# Patient Record
Sex: FEMALE | Race: WHITE | Employment: FULL TIME | URBAN - METROPOLITAN AREA
[De-identification: names, ages, dates, MRNs, and addresses within clinical notes are randomized per-mention and may not be internally consistent; named-entity substitution may affect disease eponyms.]

---

## 2018-06-07 ENCOUNTER — HOSPITAL ENCOUNTER (OUTPATIENT)
Dept: MRI IMAGING | Age: 38
Discharge: HOME | End: 2018-06-07
Attending: FAMILY MEDICINE | Admitting: FAMILY MEDICINE

## 2018-06-07 DIAGNOSIS — S40.011A CONTUSION OF RIGHT SHOULDER, INITIAL ENCOUNTER: ICD-10-CM

## 2018-06-07 DIAGNOSIS — S40.011A CONTUSION OF RIGHT SHOULDER: ICD-10-CM

## 2018-06-26 ENCOUNTER — OFFICE VISIT (OUTPATIENT)
Dept: ORTHOPEDIC SURGERY | Age: 38
End: 2018-06-26

## 2018-06-26 VITALS
WEIGHT: 163 LBS | HEART RATE: 75 BPM | HEIGHT: 69 IN | DIASTOLIC BLOOD PRESSURE: 79 MMHG | BODY MASS INDEX: 24.14 KG/M2 | SYSTOLIC BLOOD PRESSURE: 113 MMHG

## 2018-06-26 DIAGNOSIS — M75.41 SHOULDER IMPINGEMENT, RIGHT: ICD-10-CM

## 2018-06-26 DIAGNOSIS — M25.511 ACUTE PAIN OF RIGHT SHOULDER: ICD-10-CM

## 2018-06-26 DIAGNOSIS — S46.011A STRAIN OF RIGHT ROTATOR CUFF CAPSULE, INITIAL ENCOUNTER: Primary | ICD-10-CM

## 2018-06-26 PROBLEM — M25.811 SHOULDER IMPINGEMENT, RIGHT: Status: ACTIVE | Noted: 2018-06-26

## 2018-06-26 PROCEDURE — 99243 OFF/OP CNSLTJ NEW/EST LOW 30: CPT | Performed by: FAMILY MEDICINE

## 2018-06-26 RX ORDER — METHYLPREDNISOLONE 4 MG/1
TABLET ORAL
Qty: 21 KIT | Refills: 0 | Status: SHIPPED | OUTPATIENT
Start: 2018-06-26 | End: 2018-07-12 | Stop reason: ALTCHOICE

## 2018-06-26 RX ORDER — DICLOFENAC SODIUM 75 MG/1
75 TABLET, DELAYED RELEASE ORAL 2 TIMES DAILY
Qty: 60 TABLET | Refills: 3 | Status: SHIPPED | OUTPATIENT
Start: 2018-06-26 | End: 2019-01-22

## 2018-07-05 ENCOUNTER — TELEPHONE (OUTPATIENT)
Dept: PHYSICAL THERAPY | Age: 38
End: 2018-07-05

## 2018-07-10 ENCOUNTER — HOSPITAL ENCOUNTER (OUTPATIENT)
Dept: PHYSICAL THERAPY | Age: 38
Discharge: HOME | End: 2018-07-31
Attending: FAMILY MEDICINE | Admitting: FAMILY MEDICINE

## 2018-07-10 NOTE — FLOWSHEET NOTE
coordination, kinesthetic sense, posture, motor skill, proprioception and motor activation to allow for proper function of scapular, scapulothoracic and UE control with self care, carrying, lifting, driving/computer work. Home Exercise Program:    [x] (79047) Reviewed/Progressed HEP activities related to strengthening, flexibility, endurance, ROM of scapular, scapulothoracic and UE control with self care, reaching, carrying, lifting, house/yardwork, driving/computer work  [] (60957) Reviewed/Progressed HEP activities related to improving balance, coordination, kinesthetic sense, posture, motor skill, proprioception of scapular, scapulothoracic and UE control with self care, reaching, carrying, lifting, house/yardwork, driving/computer work      Manual Treatments:  PROM / STM / Oscillations-Mobs:  G-I, II, III, IV (PA's, Inf., Post.)  [] (14996) Provided manual therapy to mobilize soft tissue/joints of cervical/CT, scapular GHJ and UE for the purpose of modulating pain, promoting relaxation,  increasing ROM, reducing/eliminating soft tissue swelling/inflammation/restriction, improving soft tissue extensibility and allowing for proper ROM for normal function with self care, reaching, carrying, lifting, house/yardwork, driving/computer work    Modalities:  15' ice with ESU (premod). Pt denied precautions and contraindications to stim (even regarding adhesives). She consented to its use, and reports that it did help a bit. Charges:  Timed Code Treatment Minutes: 23'   Total Treatment Minutes: 61'     [x] EVAL (LOW) 21192   [] EVAL (MOD) 74544   [] EVAL (HIGH) 87714   [] RE-EVAL   [x] ENOCH(45202) x  1   [] IONTO  [] NMR (92832) x      [] VASO  [] Manual (88695) x       [] Other:  [x] TA x  1    [] Mech Traction (40531)  [] ES(attended) (39015)      [x] ES (un) (48273):     GOALS:  Patient stated goal: play with daughter more and work without pain    Therapist goals for Patient:   Short Term Goals:  To be achieved in: 2 weeks  1. Pt will be independent in HEP and progression per patient tolerance, in order to prevent re-injury. 2. Patient will report pain at worst less than or equal to 8/10  to facilitate improvement in movement, function, and ADLs as indicated by dunctional seficits. Long Term Goals: To be achieved in: 8 weeks  1. Pt will demo a UEFI of greater than or equal to 85% to assist with reaching prior level of function. 2. Patient will demonstrate increased AROM shoulder flexion greater than or equal to 170, ABD greater than or equal to 170, IR greater than or equal 50, ER greater than or equal to 85 to allow for proper joint functioning as indicated by patient's functional deficits. 3. Patient will demonstrate an increase in strength to 4 to allow for proper functional mobility as indicated by patient's functional deficits. 4. Patient will return to full work duty without increased symptoms or restriction. 5. Pt will return to playing and performing care for daughter without c/o pain. 6.  Pt will report pain at worst less than or equal to 2/10.    7.  Pt will perform all self care without c/o pain. Progression Towards Functional goals:  [] Patient is progressing as expected towards functional goals listed. [] Progression is slowed due to complexities listed. [] Progression has been slowed due to co-morbidities. [x] Plan just implemented, too soon to assess goals progression  [] Other:      ASSESSMENT:  See eval    Treatment/Activity Tolerance:  [] Patient tolerated treatment well [] Patient limited by fatigue  [x] Patient limited by pain  [] Patient limited by other medical complications  [] Other: Pt is a 45 y/o female presenting with diagnosis of right shoulder RCT strain/contusion from the MD.  Clinically, the pt presents with decreased ROM, decreased strength, decreased function, and increased pain consistent with the MD diagnosis.   The pt would benefit from skilled PT to return to

## 2018-07-10 NOTE — PLAN OF CARE
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  11/10 at worst.  It makes her cry if she has to work [employer]. Type: [x]Constant   []Intermittent  []Radiating []Localized []other:     Numbness/Tingling: In shoulder area since the fall    Functional Limitations/Impairments: [x]Lifting/reaching [x]Grooming [x]Carrying    [x]ADL's [x]Driving [x]Sports/Recreations   []Other:    Occupation/School:  at [employer]. 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 soccer  (insert highest prior level of function)    OBJECTIVE:     CERV ROM WFL    Cervical Flexion     Cervical Extension     Cervical SB     Cervical rotation Right is painful and slightly limited        ROM PROM AROM  Comment    L R L R    Flexion   177 109    Abduction   180 80    ER   116 40 at 60 ABD    IR   58 37 at 60 ABD    Other        Other             Strength L R Comment   Flexion 4+ NT secondary to pain    Abduction 4+     ER 4     IR 4+     Supraspinatus      Upper Trap      Lower Trap      Mid Trap      Rhomboids      Biceps      Triceps      Horizontal Abduction      Horizontal Adduction      Lats        Special Tests Left Right   Apley Scratch IR:  ER:   Cross body: IR:  ER:  Cross Body:   Neer's     Full Can     Empty Can     Mariella Pompae     Nerve Tension Testing     Speed's     Gibson's      Spurling's     Repeated Scaption                Reflexes/Sensation (myotomes/dermatomes): sensation intact to light touch    Joint mobility:  Due to pain   []Normal    [x]Hypo   []Hyper    Palpation: TTP mid trap, rhomboids, upper trap, biceps, RTC, biceps insertion    Functional Mobility/Transfers: see above    Posture: forward head. throacic kyphosis    Bandages/Dressings/Incisions: NA    Gait:  [facility]     Orthopedic Special Tests: See above                       [x] Patient history, allergies, meds reviewed. Medical chart reviewed. See intake form. moving and handling objects  Carrying, Moving and Handling Objects Current Status (): At least 40 percent but less than 60 percent impaired, limited or restricted  Carrying, Moving and Handling Objects Goal Status (): 0 percent impaired, limited or restricted    ASSESSMENT:   Functional Impairments   [x]Noted spinal or UE joint hypomobility   []Noted spinal or UE joint hypermobility   [x]Decreased UE functional ROM   [x]Decreased UE functional strength   []Abnormal reflexes/sensation/myotomal/dermatomal deficits   [x]Decreased RC/scapular/core strength and neuromuscular control   []other:      Functional Activity Limitations (from functional questionnaire and intake)   []Reduced ability to tolerate prolonged functional positions   []Reduced ability or difficulty with changes of positions or transfers between positions   []Reduced ability to maintain good posture and demonstrate good body mechanics with sitting, bending, and lifting   [x] Reduced ability or tolerance with driving and/or computer work   [x]Reduced ability to sleep   [x]Reduced ability to perform lifting, reaching, carrying tasks   [x]Reduced ability to tolerate impact through UE   [x]Reduced ability to reach behind back   [x]Reduced ability to  or hold objects   [x]Reduced ability to throw or toss an object   []other:    Participation Restrictions   [x]Reduced participation in self care activities   [x]Reduced participation in home management activities   [x]Reduced participation in work activities   [x]Reduced participation in social activities. [x]Reduced participation in sport/recreation activities. Classification:   []Signs/symptoms consistent with post-surgical status including decreased ROM, strength and function.   []Signs/symptoms consistent with joint sprain/strain   []Signs/symptoms consistent with shoulder impingement   []Signs/symptoms consistent with shoulder/elbow/wrist tendinopathy   []Signs/symptoms consistent with tolerance, in order to prevent re-injury. 2. Patient will report pain at worst less than or equal to 8/10  to facilitate improvement in movement, function, and ADLs as indicated by dunctional seficits. Long Term Goals: To be achieved in: 8 weeks  1. Pt will demo a UEFI of greater than or equal to 85% to assist with reaching prior level of function. 2. Patient will demonstrate increased AROM shoulder flexion greater than or equal to 170, ABD greater than or equal to 170, IR greater than or equal 50, ER greater than or equal to 85 to allow for proper joint functioning as indicated by patient's functional deficits. 3. Patient will demonstrate an increase in strength to 4 to allow for proper functional mobility as indicated by patient's functional deficits. 4. Patient will return to full work duty without increased symptoms or restriction. 5. Pt will return to playing and performing care for daughter without c/o pain. 6.  Pt will report pain at worst less than or equal to 2/10.    7.  Pt will perform all self care without c/o pain.       Physical Therapy Evaluation Complexity Justification  [x] A history of present problem with:  [] no personal factors and/or comorbidities that impact the plan of care;  [x]1-2 personal factors and/or comorbidities that impact the plan of care  []3 personal factors and/or comorbidities that impact the plan of care  [x] An examination of body systems using standardized tests and measures addressing any of the following: body structures and functions (impairments), activity limitations, and/or participation restrictions;:  [] a total of 1-2 or more elements   [] a total of 3 or more elements   [x] a total of 4 or more elements   [x] A clinical presentation with:  [x] stable and/or uncomplicated characteristics   [] evolving clinical presentation with changing characteristics  [] unstable and unpredictable characteristics;   [x] Clinical decision making of [x] low, [] moderate, [] high complexity using standardized patient assessment instrument and/or measurable assessment of functional outcome.     [x] EVAL (LOW) 31652 (typically 20 minutes face-to-face)  [] EVAL (MOD) 07510 (typically 30 minutes face-to-face)  [] EVAL (HIGH) 26143 (typically 45 minutes face-to-face)  [] [facility]        Electronically signed by:  Glo Couch, PT, DPT [id number]

## 2018-07-12 ENCOUNTER — OFFICE VISIT (OUTPATIENT)
Dept: ORTHOPEDIC SURGERY | Age: 38
End: 2018-07-12

## 2018-07-12 VITALS
BODY MASS INDEX: 24.14 KG/M2 | WEIGHT: 163 LBS | SYSTOLIC BLOOD PRESSURE: 110 MMHG | HEART RATE: 62 BPM | HEIGHT: 69 IN | DIASTOLIC BLOOD PRESSURE: 80 MMHG

## 2018-07-12 DIAGNOSIS — S40.011D CONTUSION OF RIGHT SHOULDER, SUBSEQUENT ENCOUNTER: Primary | ICD-10-CM

## 2018-07-12 PROBLEM — S40.011A CONTUSION OF RIGHT SHOULDER: Status: ACTIVE | Noted: 2018-07-12

## 2018-07-12 PROCEDURE — 20610 DRAIN/INJ JOINT/BURSA W/O US: CPT | Performed by: FAMILY MEDICINE

## 2018-07-12 PROCEDURE — 99213 OFFICE O/P EST LOW 20 MIN: CPT | Performed by: FAMILY MEDICINE

## 2018-07-12 NOTE — PROGRESS NOTES
sizable ganglion to the supra scapular space but no madie signs on MRI to suggest suprascapular nerve impingement or neurogenic edema. Once again Potential for right upper extremity EMG testing was discussed. We are still waiting diagnoses minute her claim that we have been allowed for a shoulder contusion. We have also gotten permission to perform a subacromial injection to her right shoulder which was performed today. We did inject her right shoulder today using 2 mL of Celestone, 4 marketing, free Xylocaine. I would like her to continue with physical therapy and her current modified work duty work restrictions. We will see her back in about 3-4 weeks for follow-up. She will contact us the interim with questions or concerns. Once again discussed potential for upper extremity EMG to evaluate for suprascapular nerve entrapment. This dictation was performed with a verbal recognition program (DRAGON) and it was checked for errors. It is possible that there are still dictated errors within this office note. If so, please bring any errors to my attention for an addendum. All efforts were made to ensure that this office note is accurate.

## 2018-07-19 ENCOUNTER — HOSPITAL ENCOUNTER (OUTPATIENT)
Dept: PHYSICAL THERAPY | Age: 38
Discharge: HOME | End: 2018-07-20
Admitting: FAMILY MEDICINE
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
## 2018-07-23 ENCOUNTER — HOSPITAL ENCOUNTER (OUTPATIENT)
Dept: PHYSICAL THERAPY | Age: 38
Discharge: HOME | End: 2018-07-24
Admitting: FAMILY MEDICINE

## 2018-07-23 NOTE — FLOWSHEET NOTE
Orthopaedics and [address] [place] [address] DR BRIAN CRUZ      Physical Therapy Daily Treatment Note  Date:  2018    Patient Name:  David Espinal    :  1980  MRN: [id number]  Medical/Treatment Diagnosis Information:  · Diagnosis: L77.478F (ICD-10-CM) - Strain of muscle(s) and tendon(s) of the rotator cuff of right shoulder. Onset- 13 May 2018  · Treatment Diagnosis: T15.933O (ICD-10-CM) - Strain of muscle(s) and tendon(s) of the rotator cuff of right shoulder, initial encounter  Insurance/Certification information:  PT Insurance Information: [id number]  Physician Information:  Referring Practitioner: Ragini Saavedra of care signed (Y/N):     Date of Patient follow up with Physician: 9 Aug 2018    G-Code (if applicable):      Date G-Code Applied:  7/10/2018    PT G-Codes  Functional Assessment Tool Used: UEFI  Score: 50%  Functional Limitation: Carrying, moving and handling objects  Carrying, Moving and Handling Objects Current Status (): At least 40 percent but less than 60 percent impaired, limited or restricted  Carrying, Moving and Handling Objects Goal Status (): 0 percent impaired, limited or restricted    Progress Note: []  Yes  [x]  No  Next due by: Visit #10 or 10 Aug 2018     Latex Allergy:  [x]NO      []YES  Preferred Language for Healthcare:   [x]English       []other:    Visit # Insurance Allowable   3 8 approved through 18     Pain level:  6/10     SUBJECTIVE: She had a rough morning with her daughter. She did get a call regarding her stim unit, but she was at the hospital and didn't get a chance to call her back. She does plan on calling her today to set up a time to meet with her and get the stim unit.       OBJECTIVE: 18   Observation:   Test measurements:      ROM PROM AROM  Comment    L R L R    Flexion  78 table slides, 98 hangs      Abduction        ER        IR        Other        Other             Strength L R Comment   Flexion      Abduction      ER      IR for activities related to improving balance, coordination, kinesthetic sense, posture, motor skill, proprioception and motor activation to allow for proper function of scapular, scapulothoracic and UE control with self care, carrying, lifting, driving/computer work. Home Exercise Program:    [x] (73441) Reviewed/Progressed HEP activities related to strengthening, flexibility, endurance, ROM of scapular, scapulothoracic and UE control with self care, reaching, carrying, lifting, house/yardwork, driving/computer work  [] (32787) Reviewed/Progressed HEP activities related to improving balance, coordination, kinesthetic sense, posture, motor skill, proprioception of scapular, scapulothoracic and UE control with self care, reaching, carrying, lifting, house/yardwork, driving/computer work      Manual Treatments:  PROM / STM / Oscillations-Mobs:  G-I, II, III, IV (PA's, Inf., Post.)  [] (62056) Provided manual therapy to mobilize soft tissue/joints of cervical/CT, scapular GHJ and UE for the purpose of modulating pain, promoting relaxation,  increasing ROM, reducing/eliminating soft tissue swelling/inflammation/restriction, improving soft tissue extensibility and allowing for proper ROM for normal function with self care, reaching, carrying, lifting, house/yardwork, driving/computer work    Modalities:  15' ice with ESU (premod). Pt denied precautions and contraindications to stim (even regarding adhesives). She consented to its use, and reports that it did help a bit.     Charges:   Timed Code Treatment Minutes: 40'   Total Treatment Minutes: 61'     [] EVAL (LOW) 455 1011   [] EVAL (MOD) 96259   [] EVAL (HIGH) 62782   [] RE-EVAL   [x] (58247) x  1   [] IONTO  [] NMR (42361) x      [] VASO  [] Manual (80325) x       [] Other:  [x] TA x  2    [] Mech Traction (70915)  [] ES(attended) (61082)      [x] ES (un) (59557):     GOALS:  Patient stated goal: play with daughter more and work without pain    Therapist goals for Patient:   Short Term Goals: To be achieved in: 2 weeks  1. Pt will be independent in HEP and progression per patient tolerance, in order to prevent re-injury. 2. Patient will report pain at worst less than or equal to 8/10  to facilitate improvement in movement, function, and ADLs as indicated by dunctional seficits. Long Term Goals: To be achieved in: 8 weeks  1. Pt will demo a UEFI of greater than or equal to 85% to assist with reaching prior level of function. 2. Patient will demonstrate increased AROM shoulder flexion greater than or equal to 170, ABD greater than or equal to 170, IR greater than or equal 50, ER greater than or equal to 85 to allow for proper joint functioning as indicated by patient's functional deficits. 3. Patient will demonstrate an increase in strength to 4 to allow for proper functional mobility as indicated by patient's functional deficits. 4. Patient will return to full work duty without increased symptoms or restriction. 5. Pt will return to playing and performing care for daughter without c/o pain. 6.  Pt will report pain at worst less than or equal to 2/10.    7.  Pt will perform all self care without c/o pain. Progression Towards Functional goals:  [] Patient is progressing as expected towards functional goals listed. [] Progression is slowed due to complexities listed. [] Progression has been slowed due to co-morbidities. [x] Plan just implemented, too soon to assess goals progression  [] Other:      ASSESSMENT:      Treatment/Activity Tolerance:  [] Patient tolerated treatment well [] Patient limited by fatigue  [x] Patient limited by pain  [] Patient limited by other medical complications  [] Other:  Pt continues to have significant pain that limits her. She is motivated to push herself through her exercises. She did have less pain today when entering the clinic; however, her motion is still limited due to pain.   She will continue to look into getting her home stim unit. Prognosis: [] Good [] Fair  [] Poor    Patient Requires Follow-up: [x] Yes  [] No    PLAN: Consider shrugs. Consider manual stretching.    [x] Continue per plan of care [] Alter current plan (see comments)  [] Plan of care initiated [] Hold pending MD visit [] Discharge    Electronically signed by: MATEO Trejo [id number]

## 2018-07-25 ENCOUNTER — HOSPITAL ENCOUNTER (OUTPATIENT)
Dept: PHYSICAL THERAPY | Age: 38
Discharge: HOME | End: 2018-07-26
Admitting: FAMILY MEDICINE

## 2018-07-25 NOTE — FLOWSHEET NOTE
Lats          RESTRICTIONS/PRECAUTIONS: some allergies to some adhesives    Exercises/Interventions:   Exercise/Equipment Resistance/Repetitions Other comments   Aerobic Conditioning     Aerodyne          Stretching/PROM     Wand 10x:10 Cane seated ER   Table Slides 10x:10 Flex/scap   Wall slides      UE [place]     Pulleys 10x:10 flex  9x:10 scaption    Pendulum 10x:10  hang   BB IR 7x:10 cane   SL IR     Pec doorway stretch     CBA stretch     UT stretch 5x:30    LS stretch 5x:30    Anterior shoulder stretch off CC 5x:10                   Isometrics     Retraction        Weight shift    Flexion    Abduction    External Rotation    Internal Rotation    Biceps     Triceps          PRE's     Flexion     Abduction     External Rotation     Internal Rotation     Shrugs     EXT     Reverse Flys     Serratus     Horizontal Abd with ER     Biceps     Triceps     Retraction     UK Deltoid 5 with assistance stationary             Cable Column/Theraband     External Rotation     Internal Rotation     Shrugs     Lats     Ext     Flex     Scapular Retraction 10x red    BIC     TRIC     PNF          Dynamic Stability          Plyoback            Pt Education:       Therapeutic Exercise and NMR EXR  [x] (28864) Provided verbal/tactile cueing for activities related to strengthening, flexibility, endurance, ROM  for improvements in scapular, scapulothoracic and UE control with self care, reaching, carrying, lifting, house/yardwork, driving/computer work.    [] (93740) Provided verbal/tactile cueing for activities related to improving balance, coordination, kinesthetic sense, posture, motor skill, proprioception  to assist with  scapular, scapulothoracic and UE control with self care, reaching, carrying, lifting, house/yardwork, driving/computer work.     Therapeutic Activities:    [] (21345 or 49491) Provided verbal/tactile cueing for activities related to improving balance, coordination, kinesthetic sense, posture, motor skill, proprioception and motor activation to allow for proper function of scapular, scapulothoracic and UE control with self care, carrying, lifting, driving/computer work. Home Exercise Program:    [x] (62733) Reviewed/Progressed HEP activities related to strengthening, flexibility, endurance, ROM of scapular, scapulothoracic and UE control with self care, reaching, carrying, lifting, house/yardwork, driving/computer work  [] (47445) Reviewed/Progressed HEP activities related to improving balance, coordination, kinesthetic sense, posture, motor skill, proprioception of scapular, scapulothoracic and UE control with self care, reaching, carrying, lifting, house/yardwork, driving/computer work      Manual Treatments:  PROM / STM / Oscillations-Mobs:  G-I, II, III, IV (PA's, Inf., Post.)  [] (97515) Provided manual therapy to mobilize soft tissue/joints of cervical/CT, scapular GHJ and UE for the purpose of modulating pain, promoting relaxation,  increasing ROM, reducing/eliminating soft tissue swelling/inflammation/restriction, improving soft tissue extensibility and allowing for proper ROM for normal function with self care, reaching, carrying, lifting, house/yardwork, driving/computer work    Modalities:  15' ice with ESU (premod). Pt denied precautions and contraindications to stim (even regarding adhesives). She consented to its use, and reports that it did help a bit. Charges:   Timed Code Treatment Minutes: 40'   Total Treatment Minutes: 61'     [] EVAL (LOW) 455 1011   [] EVAL (MOD) 35097   [] EVAL (HIGH) 78523   [] RE-EVAL   [x] JOHN(77043) x  1   [] IONTO  [] NMR (13474) x      [] VASO  [] Manual (55883) x       [] Other:  [x] TA x  2    [] Mech Traction (51418)  [] ES(attended) (39125)      [x] ES (un) (98556):     GOALS:  Patient stated goal: play with daughter more and work without pain    Therapist goals for Patient:   Short Term Goals: To be achieved in: 2 weeks  1.  Pt will be independent in HEP and progression per patient tolerance, in order to prevent re-injury. 2. Patient will report pain at worst less than or equal to 8/10  to facilitate improvement in movement, function, and ADLs as indicated by dunctional seficits. Long Term Goals: To be achieved in: 8 weeks  1. Pt will demo a UEFI of greater than or equal to 85% to assist with reaching prior level of function. 2. Patient will demonstrate increased AROM shoulder flexion greater than or equal to 170, ABD greater than or equal to 170, IR greater than or equal 50, ER greater than or equal to 85 to allow for proper joint functioning as indicated by patient's functional deficits. 3. Patient will demonstrate an increase in strength to 4 to allow for proper functional mobility as indicated by patient's functional deficits. 4. Patient will return to full work duty without increased symptoms or restriction. 5. Pt will return to playing and performing care for daughter without c/o pain. 6.  Pt will report pain at worst less than or equal to 2/10.    7.  Pt will perform all self care without c/o pain. Progression Towards Functional goals:  [] Patient is progressing as expected towards functional goals listed. [] Progression is slowed due to complexities listed. [] Progression has been slowed due to co-morbidities. [x] Plan just implemented, too soon to assess goals progression  [] Other:      ASSESSMENT:      Treatment/Activity Tolerance:  [] Patient tolerated treatment well [] Patient limited by fatigue  [x] Patient limited by pain  [] Patient limited by other medical complications  [] Other:  Pt fab tx fair. She continues to have pain that limits her tx. She was able to fab all of the 5' of [place] deltoid program.  Due to her pain though, we did hold the rest of the new additions. Prognosis: [] Good [] Fair  [] Poor    Patient Requires Follow-up: [x] Yes  [] No    PLAN: Consider shrugs. Consider manual stretching.    [x] Continue per plan of care [] Alter current plan (see comments)  [] Plan of care initiated [] Hold pending MD visit [] Discharge    Electronically signed by: Latoya Kaplan [id number] [id number]

## 2018-07-31 ENCOUNTER — TELEPHONE (OUTPATIENT)
Dept: PHYSICAL THERAPY | Age: 38
End: 2018-07-31

## 2018-08-01 ENCOUNTER — HOSPITAL ENCOUNTER (OUTPATIENT)
Dept: PHYSICAL THERAPY | Age: 38
Discharge: HOME | End: 2018-08-02
Admitting: FAMILY MEDICINE

## 2018-08-01 ENCOUNTER — HOSPITAL ENCOUNTER (OUTPATIENT)
Dept: OTHER | Age: 38
Discharge: HOME | End: 2018-08-31
Attending: FAMILY MEDICINE | Admitting: FAMILY MEDICINE

## 2018-08-01 DIAGNOSIS — S40.011A CONTUSION OF RIGHT SHOULDER, INITIAL ENCOUNTER: Primary | ICD-10-CM

## 2018-08-01 NOTE — FLOWSHEET NOTE
Orthopaedics and [address] [place] [address] DR BRIAN CRUZ      Physical Therapy Daily Treatment Note  Date:  2018    Patient Name:  Luis Carlos Espinal    :  1980  MRN: [id number]  Medical/Treatment Diagnosis Information:  · Diagnosis: R46.996S (ICD-10-CM) - Strain of muscle(s) and tendon(s) of the rotator cuff of right shoulder. Onset- 13 May 2018  · Treatment Diagnosis: M14.392O (ICD-10-CM) - Strain of muscle(s) and tendon(s) of the rotator cuff of right shoulder, initial encounter  Insurance/Certification information:  PT Insurance Information: [facility]  Physician Information:  Referring Practitioner: Kya Barajas of care signed (Y/N):     Date of Patient follow up with Physician: 9 Aug 2018    G-Code (if applicable):      Date G-Code Applied:  7/10/2018    PT G-Codes  Functional Assessment Tool Used: UEFI  Score: 50%  Functional Limitation: Carrying, moving and handling objects  Carrying, Moving and Handling Objects Current Status (): At least 40 percent but less than 60 percent impaired, limited or restricted  Carrying, Moving and Handling Objects Goal Status (): 0 percent impaired, limited or restricted    Progress Note: []  Yes  [x]  No  Next due by: Visit #10 or 10 Aug 2018     Latex Allergy:  [x]NO      []YES  Preferred Language for Healthcare:   [x]English       []other:    Visit # Insurance Allowable   5 8 approved through 18     Pain level:  5-6/10     SUBJECTIVE: She feels her pain is constantly around 5-6/10. She feels her motion is getting better. She threw darts on Monday, but she was having quite a bit of pain afterwards. She is frustrated because it's not changed in regards to her pain. She is having a difficult time with this especially as she has to care for her daughter. She is to get her stim unit on Friday as the rep is to meet her at work.         OBJECTIVE: 18   Observation:   Test measurements:      ROM PROM AROM  Comment    L R L R    Flexion  84 table slides, 105 sense, posture, motor skill, proprioception  to assist with  scapular, scapulothoracic and UE control with self care, reaching, carrying, lifting, house/yardwork, driving/computer work. Therapeutic Activities:    [] (01369 or 16278) Provided verbal/tactile cueing for activities related to improving balance, coordination, kinesthetic sense, posture, motor skill, proprioception and motor activation to allow for proper function of scapular, scapulothoracic and UE control with self care, carrying, lifting, driving/computer work. Home Exercise Program:    [x] (33241) Reviewed/Progressed HEP activities related to strengthening, flexibility, endurance, ROM of scapular, scapulothoracic and UE control with self care, reaching, carrying, lifting, house/yardwork, driving/computer work  [] (24090) Reviewed/Progressed HEP activities related to improving balance, coordination, kinesthetic sense, posture, motor skill, proprioception of scapular, scapulothoracic and UE control with self care, reaching, carrying, lifting, house/yardwork, driving/computer work      Manual Treatments:  PROM / STM / Oscillations-Mobs:  G-I, II, III, IV (PA's, Inf., Post.)  [] (44257) Provided manual therapy to mobilize soft tissue/joints of cervical/CT, scapular GHJ and UE for the purpose of modulating pain, promoting relaxation,  increasing ROM, reducing/eliminating soft tissue swelling/inflammation/restriction, improving soft tissue extensibility and allowing for proper ROM for normal function with self care, reaching, carrying, lifting, house/yardwork, driving/computer work    Modalities:  15' ice with ESU (premod). Pt denied precautions and contraindications to stim (even regarding adhesives). She consented to its use, and reports that it did help a bit.     Charges:   Timed Code Treatment Minutes: 40'   Total Treatment Minutes: 72'     [] EVAL (LOW) 455 1011   [] EVAL (MOD) 17606   [] EVAL (HIGH) 31236   [] RE-EVAL   [x] FW(63391) x  1   []

## 2018-08-06 ENCOUNTER — HOSPITAL ENCOUNTER (OUTPATIENT)
Dept: PHYSICAL THERAPY | Age: 38
Discharge: HOME | End: 2018-08-07
Admitting: FAMILY MEDICINE

## 2018-08-06 NOTE — PLAN OF CARE
Orthopaedics and [address] [place] [address] DR BRIAN CRUZ     Physical Therapy Re-Certification Plan of Care    Dear Dr. Aamir Naik,    We had the pleasure of treating the following patient for physical therapy services at [address] [place] [address]. A summary of our findings can be found in the updated assessment below. This includes our plan of care. If you have any questions or concerns regarding these findings, please do not hesitate to contact me at the office phone number checked above. Thank you for the referral.     Physician Signature:________________________________Date:__________________  By signing above (or electronic signature), therapists plan is approved by physician    Date Range Of Visits: 7/10/  Total Visits to Date: 6  Overall Response to Treatment:   [x]Patient is responding well to treatment and improvement is noted with regards  to goals   []Patient should continue to improve in reasonable time if they continue HEP   []Patient has plateaued and is no longer responding to skilled PT intervention    []Patient is getting worse and would benefit from return to referring MD   []Patient unable to adhere to initial POC   []Other: Pt fab tx fair. We did not do stim today as she has her own unit and is planning on putting it on in the car while driving to work. She reports that she could have pushed her motion more, but it does hurt. She did demo improved motion from her last visit. However, at most tx sessions she is on the verge of tearing up from the pain she has. She also demo a lower functional scale. She would benefit from further consultation from the MD.                         Physical Therapy Daily Treatment Note  Date:  2018    Patient Name:  Aneudy Espinal    :  1980  MRN: [id number]  Medical/Treatment Diagnosis Information:  · Diagnosis: K47.439P (ICD-10-CM) - Strain of muscle(s) and tendon(s) of the rotator cuff of right shoulder.   Onset- 13 May 2018  · Treatment Diagnosis: S46.011A (ICD-10-CM) - Strain of muscle(s) and tendon(s) of the rotator cuff of right shoulder, initial encounter  Insurance/Certification information:  PT Insurance Information: [id number]  Physician Information:  Referring Practitioner: Ragini Saavedra of care signed (Y/N):     Date of Patient follow up with Physician: 9 Aug 2018    G-Code (if applicable):      Date G-Code Applied:  8/6/2018   PT G-Codes  Functional Assessment Tool Used: UEFI  Score: 45%  Functional Limitation: Carrying, moving and handling objects  Carrying, Moving and Handling Objects Current Status (): At least 40 percent but less than 60 percent impaired, limited or restricted  Carrying, Moving and Handling Objects Goal Status (): 0 percent impaired, limited or restricted     Progress Note: [x]  Yes  []  No  Next due by: Visit #16 or 16 Sept 2018     Latex Allergy:  [x]NO      []YES  Preferred Language for Healthcare:   [x]English       []other:    Visit # Insurance Allowable   6 8 approved through 8/6/18     Pain level:  4-5/10     SUBJECTIVE:  She did get her stim unit on Friday. She was able to use it at work, and that was very helpful. She only takes the medication that she was prescribed by a MD she saw before [facility]. She is unsure of what it was. She only takes this at night. OBJECTIVE: 8/6/18   Observation:   Test measurements:      ROM PROM AROM  Comment    L R L R    Flexion    118 Pain t/o   Abduction    94    ER    39 at 70 ABD    IR    38 at 70 ABD    Other        Other             Strength L R Comment   Flexion  2- All limited due to pain. No MMT taken.   Only judged by available range in gravity eliminated position   Abduction  2-    ER  2-    IR  2-    Supraspinatus      Upper Trap      Lower Trap      Mid Trap      Rhomboids      Biceps      Triceps      Horizontal Abduction      Horizontal Adduction      Lats          RESTRICTIONS/PRECAUTIONS: some allergies to some adhesives    Exercises/Interventions:   Exercise/Equipment Resistance/Repetitions Other comments   Aerobic Conditioning     Aerodyne          Stretching/PROM     Wand 10x:10 ER at neutral in supine  10x:10 ER at 45 ABD in supine    Table Slides 10x:10 Flex/scap   Wall slides      UE [place]     Pulleys 10x:10 flex  10x:10 scaption    Pendulum 10x:10  hang   BB IR 10x:10 cane   SL IR     Pec doorway stretch     CBA stretch     UT stretch 5x:30    LS stretch 5x:30    Anterior shoulder stretch off CC                    Isometrics     Retraction        Weight shift    Flexion    Abduction    External Rotation    Internal Rotation    Biceps     Triceps          PRE's     Flexion     Abduction     External Rotation     Internal Rotation     Shrugs     EXT     Reverse Flys     Serratus     Horizontal Abd with ER     Biceps     Triceps     Retraction     UK Deltoid 5' with assistance stationary             Cable Column/Theraband     External Rotation     Internal Rotation     Shrugs     Lats     Ext     Flex     Scapular Retraction     BIC     TRIC     PNF          Dynamic Stability          Plyoback            Pt Education:       Therapeutic Exercise and NMR EXR  [x] (65623) Provided verbal/tactile cueing for activities related to strengthening, flexibility, endurance, ROM  for improvements in scapular, scapulothoracic and UE control with self care, reaching, carrying, lifting, house/yardwork, driving/computer work.    [] (95630) Provided verbal/tactile cueing for activities related to improving balance, coordination, kinesthetic sense, posture, motor skill, proprioception  to assist with  scapular, scapulothoracic and UE control with self care, reaching, carrying, lifting, house/yardwork, driving/computer work.     Therapeutic Activities:    [] (14671 or 29312) Provided verbal/tactile cueing for activities related to improving balance, coordination, kinesthetic sense, posture, motor skill, proprioception and motor activation to allow for proper function of scapular, scapulothoracic and UE control with self care, carrying, lifting, driving/computer work. Home Exercise Program:    [x] (02484) Reviewed/Progressed HEP activities related to strengthening, flexibility, endurance, ROM of scapular, scapulothoracic and UE control with self care, reaching, carrying, lifting, house/yardwork, driving/computer work  [] (48346) Reviewed/Progressed HEP activities related to improving balance, coordination, kinesthetic sense, posture, motor skill, proprioception of scapular, scapulothoracic and UE control with self care, reaching, carrying, lifting, house/yardwork, driving/computer work      Manual Treatments:  PROM / STM / Oscillations-Mobs:  G-I, II, III, IV (PA's, Inf., Post.)  [] (49477) Provided manual therapy to mobilize soft tissue/joints of cervical/CT, scapular GHJ and UE for the purpose of modulating pain, promoting relaxation,  increasing ROM, reducing/eliminating soft tissue swelling/inflammation/restriction, improving soft tissue extensibility and allowing for proper ROM for normal function with self care, reaching, carrying, lifting, house/yardwork, driving/computer work    Modalities:   declined    Charges:   Timed Code Treatment Minutes: 38'   Total Treatment Minutes: 50'     [] EVAL (LOW) 29946   [] EVAL (MOD) 96264   [] EVAL (HIGH) 22694   [] RE-EVAL   [x] YP(80712) x  1   [] IONTO  [] NMR (81254) x      [] VASO  [] Manual (66906) x       [] Other:  [x] TA x  2    [] Mech Traction (58183)  [] ES(attended) (28212)      [] ES (un) (86004):     GOALS:  Patient stated goal: play with daughter more and work without pain    Therapist goals for Patient:   Short Term Goals: To be achieved in: 2 weeks  1. Pt will be independent in HEP and progression per patient tolerance, in order to prevent re-injury. -met  2.  Patient will report pain at worst less than or equal to 8/10  to facilitate improvement in movement, function, and

## 2018-08-09 ENCOUNTER — OFFICE VISIT (OUTPATIENT)
Dept: ORTHOPEDIC SURGERY | Age: 38
End: 2018-08-09

## 2018-08-09 VITALS
SYSTOLIC BLOOD PRESSURE: 120 MMHG | BODY MASS INDEX: 24.14 KG/M2 | HEART RATE: 65 BPM | DIASTOLIC BLOOD PRESSURE: 84 MMHG | HEIGHT: 69 IN | WEIGHT: 163 LBS

## 2018-08-09 DIAGNOSIS — S40.011D CONTUSION OF RIGHT SHOULDER, SUBSEQUENT ENCOUNTER: Primary | ICD-10-CM

## 2018-08-09 PROCEDURE — 99213 OFFICE O/P EST LOW 20 MIN: CPT | Performed by: FAMILY MEDICINE

## 2018-08-09 RX ORDER — METHYLPREDNISOLONE 4 MG/1
TABLET ORAL
Qty: 21 KIT | Refills: 0 | Status: SHIPPED | OUTPATIENT
Start: 2018-08-09 | End: 2018-10-23

## 2018-08-09 RX ORDER — TRAMADOL HYDROCHLORIDE 50 MG/1
50 TABLET ORAL EVERY 6 HOURS PRN
Qty: 28 TABLET | Refills: 0 | Status: SHIPPED | OUTPATIENT
Start: 2018-08-09 | End: 2018-08-16

## 2018-08-09 RX ORDER — DICLOFENAC SODIUM 75 MG/1
75 TABLET, DELAYED RELEASE ORAL 2 TIMES DAILY
Qty: 60 TABLET | Refills: 3 | Status: SHIPPED | OUTPATIENT
Start: 2018-08-09 | End: 2019-01-22

## 2018-08-09 NOTE — PROGRESS NOTES
She has some intrasubstance tearing to the infraspinatus tendon versus tendinosis and evidence of some a.c. joint arthropathy with some mild outlet related impingement. Once again she has not had dedicated therapy. Overall her symptoms have not substantially improved since her injury on 5/13/2018 prepped and today's orthopedic and sports consultation. To work but will have substantial pain coming home from work. She only episodically utilizes her sling but is been fairly consistent with icing. She has not had any oral or injected steroids and she is uncertain as to her her allow diagnoses at this point. She was last seen in the office on 6/26/2008 was started on conservative treatment for her right shoulder. Once again she does have a suspected right shoulder rotator cuff strain with pain suprascapular space likely ganglion without signs of supra or infraspinatus neurogenic edema. She has just started physical therapy and does continue to have pain but has been following her modified work duties with avoidance of overhead activity. She only got questionable improvement following her Medrol Dosepak and is tolerating her anti-inflammatories. Once in the locking or catching. Continued difficulty with overhead activity and she has been increasing. This periodically utilize her sling and has been able to work modified duties as her job is primarily that of a . No active locking catching neck pain or radicular symptoms. She was last seen in the office on 7/12/2018 and continued on conservative treatment for right shoulder. She is now almost 3 months after injury in 6 weeks into her treatment but despite this her symptoms are persisting. She got a very transient 50% improvement of shoulder pain only for a couple of days following her subacromial steroid injection. We are continuing to await approval of additional diagnoses beyond shoulder contusion.   She has been attending physical therapy but continues to have substantial amounts of pain. For some reason the pharmacy only gave her 14 days of diclofenac and she isn't having to take only over-the-counter Advil only. An ineffective for her. She is trying to perform a home-based exercises but active shoulder elevation is painful. Denies neck pain or radicular symptoms. We did contact the Workmen's Comp. department and apparently her case is being passed on for physician review. Her MCL is care [employer]. She is having difficulty sleeping and any active elevation and reaching away from her body is painful. Denies neck pain or radicular symptoms or previous history of shoulder pain prior to her fall was 3 months ago. Medical History  Patient's medications, allergies, past medical, surgical, social and family histories were reviewed and updated as appropriate. Review of Systems  Relevant review of systems reviewed on 6/26/2018 and available in the patient's chart under the medial tab. Vital Signs  Vitals:    08/09/18 0912   BP: 120/84   Pulse: 65         General Exam:   Constitutional: Patient is adequately groomed with no evidence of malnutrition  DTRs: Deep tendon reflexes are intact  Mental Status: The patient is oriented to time, place and person. The patient's mood and affect are appropriate. Lymphatic: The lymphatic examination bilaterally reveals all areas to be without enlargement or induration. Vascular: Examination reveals no swelling or calf tenderness. Peripheral pulses are palpable and 2+. Neurological: The patient has good coordination. There is no weakness or sensory deficit. Shoulder Examination    Inspection:  There is no high-grade deformity focal atrophy or evidence of substantial effusion. She does have some mild a.c. crepitation.       Palpation:  Continued Global tenderness involving the shoulder with very sharp 7-8 out of 10 pain with palpation of the posterior cuff greater tuberosity and lesions. Strength and tone are normal.  Neck: Examination of the neck does not show any tenderness, deformity or injury. Range of motion is unremarkable. There is no gross instability. There are no rashes, ulcerations or lesions. Strength and tone are normal.         Diagnostic Test Findings:   Right shoulder MRI performed 6/7/2018 as listed above  Narrative   MRI right shoulder       HISTORY: Contusion and pain       Correlation with conventional radiographs 5/13/2018       Acromioclavicular joint is arthropathic with subclavicular spur   producing mild medial arch stenosis. Lateral subacromial arch   space is patent with flattened configuration of the anterior   acromion.       Located in the suprascapular space and extending inferiorly into   the spinal glenoid notch is a multiseptated cystic mass measuring   1.4 x 2.5 CM in AP and transverse dimensions. In the sagittal   plane, the lesion extends for a distance of 3.2 cm series 6 image   23. Posterior inferior margin of the mass projects towards the   posterior superior labrum.       No signs of muscle denervation despite strategic location of the   mass in the region of the suprascapular nerve. Shoulder girdle   muscles are of normal bulk and signal intensity.       Linear intrasubstance signal alteration within the infraspinatus   tendon is seen in the coronal plane and may represent tendinosis   or intrasubstance delaminating type tear. Supraspinatus tendon   intact. Subscapularis insertion and long head biceps tendon   normal. Subcoracoid fat is preserved with intact coracohumeral and   transverse humeral ligaments.       Glenohumeral alignment anatomical. Anteroinferior and posterior   glenoid alina are intact.  Anterosuperior labrum also intact.           Impression       Large multiseptated mass in the suprascapular space, extending   into the spinal glenoid notch, consistent with ganglion cyst,   favored over giant paralabral cyst.       No signs of supraspinatus or infraspinatus muscle denervation       Grossly intact superior glenoid labrum.       AC joint arthropathy with mild outlet-related impingement       Intrasubstance linear signal within the infraspinatus tendon   consistent with either tendinosis or intrasubstance delaminating   type tear. Assessment:  #1. Nearly 12 weeks status post fall with right shoulder contusion with unrehabilitated rotator cuff straining shoulder pain with impingement and MRI documented suprascapular ganglion without MRI documented signs of supra or infraspinatus neurogenic edema to suggest suprascapular nerve entrapment with ongoing significant pain unresponsive to initial conservative treatment. Impression:    Encounter Diagnosis   Name Primary?  Contusion of right shoulder, subsequent encounter Yes       Office Procedures:     Orders Placed This Encounter   Procedures    EMG     Standing Status:   Future     Standing Expiration Date:   8/9/2019     Scheduling Instructions:      Frantz Prescott MD      Affiliated With: [facility]            [address] [address]      Phone: [phone]      Fax: [phone]            Please fax results to Dr Sera Spencer at [phone]     Order Specific Question:   Which body part? Answer:   r/o supascapular nerve entrapment Right Shoulder       Treatment Plan:  Treatment options were discussed with Shreyas Callejas today. We did review her previous plain films and MRI findings of her shoulder. I think we are primarily dealing with residuals from her right shoulder contusion with unrehabilitated, straining shoulder pain and impingement.   She does have a sizable ganglion to the supra scapular space but on her initial MRI there is no evidence of neurogenic edema to the supraspinatus or infraspinatus to suggest definitive suprascapular nerve entrapment but her pain symptoms have been ongoing and she

## 2018-08-21 ENCOUNTER — TELEPHONE (OUTPATIENT)
Dept: ORTHOPEDIC SURGERY | Age: 38
End: 2018-08-21

## 2018-09-01 ENCOUNTER — HOSPITAL ENCOUNTER (OUTPATIENT)
Dept: OTHER | Age: 38
Discharge: HOME | End: 2018-09-01
Attending: FAMILY MEDICINE | Admitting: FAMILY MEDICINE

## 2018-10-18 ENCOUNTER — OFFICE VISIT (OUTPATIENT)
Dept: ORTHOPEDIC SURGERY | Age: 38
End: 2018-10-18
Payer: COMMERCIAL

## 2018-10-18 DIAGNOSIS — M79.601 PAIN OF RIGHT UPPER EXTREMITY: Primary | ICD-10-CM

## 2018-10-18 PROCEDURE — 95909 NRV CNDJ TST 5-6 STUDIES: CPT | Performed by: PHYSICAL MEDICINE & REHABILITATION

## 2018-10-18 PROCEDURE — 95886 MUSC TEST DONE W/N TEST COMP: CPT | Performed by: PHYSICAL MEDICINE & REHABILITATION

## 2018-10-23 ENCOUNTER — OFFICE VISIT (OUTPATIENT)
Dept: ORTHOPEDIC SURGERY | Age: 38
End: 2018-10-23
Payer: COMMERCIAL

## 2018-10-23 VITALS — WEIGHT: 162.92 LBS | HEIGHT: 69 IN | BODY MASS INDEX: 24.13 KG/M2

## 2018-10-23 DIAGNOSIS — S40.011D CONTUSION OF RIGHT SHOULDER, SUBSEQUENT ENCOUNTER: ICD-10-CM

## 2018-10-23 DIAGNOSIS — S46.011D STRAIN OF RIGHT ROTATOR CUFF CAPSULE, SUBSEQUENT ENCOUNTER: ICD-10-CM

## 2018-10-23 DIAGNOSIS — M75.41 SHOULDER IMPINGEMENT, RIGHT: Primary | ICD-10-CM

## 2018-10-23 PROCEDURE — 99214 OFFICE O/P EST MOD 30 MIN: CPT | Performed by: FAMILY MEDICINE

## 2018-10-23 RX ORDER — PREDNISONE 20 MG/1
TABLET ORAL
Qty: 20 TABLET | Refills: 0 | Status: SHIPPED | OUTPATIENT
Start: 2018-10-23 | End: 2019-01-22

## 2018-10-23 RX ORDER — CYCLOBENZAPRINE HCL 10 MG
10 TABLET ORAL 3 TIMES DAILY PRN
Qty: 60 TABLET | Refills: 0 | Status: SHIPPED | OUTPATIENT
Start: 2018-10-23 | End: 2018-11-02

## 2018-10-23 RX ORDER — DICLOFENAC SODIUM 75 MG/1
75 TABLET, DELAYED RELEASE ORAL 2 TIMES DAILY
Qty: 60 TABLET | Refills: 3 | Status: SHIPPED | OUTPATIENT
Start: 2018-10-23 | End: 2021-03-28

## 2018-10-23 NOTE — PROGRESS NOTES
twice daily and added Flexeril 10 mg to be taken q.8 hours for spasm. She apparently has been let go from her job and does have upcoming job interviews. Given her ongoing pain, she may wish to consider legal representation for this case. Per the patient, her previous employers are working with her as this is a workman's comp injury. We did fill out a C9 to get her back into physical therapy with manual techniques and also to see Dr. Zeeshan Gilliam for orthopedic surgical consultation. She has failed to respond to aggressive conservative treatment and does have evidence of a sizable probable ganglion to the suprascapular space but also documented impingement which may be precluding her recovery. Icing and activity modification was discussed. Certainly her EMG is encouraging. She will contact us in with questions or concerns. We will continue with modified work duties and we did talk [employer] for her. This dictation was performed with a verbal recognition program (DRAGON) and it was checked for errors. It is possible that there are still dictated errors within this office note. If so, please bring any errors to my attention for an addendum. All efforts were made to ensure that this office note is accurate.

## 2018-10-29 ENCOUNTER — TELEPHONE (OUTPATIENT)
Dept: ORTHOPEDIC SURGERY | Age: 38
End: 2018-10-29

## 2018-10-29 NOTE — TELEPHONE ENCOUNTER
Left voicemail for patient that C9 approvals had gone through for referral to Dr. Fanny Bamberger as well as 4 more PT visits. Told her someone from [employer] dept would be contacting her to schedule appt with Dr. Fanny Bamberger and she should be able to contact [place] [facility] office to schedule her physical therapy. Told her to call me with any questions.

## 2018-11-26 ENCOUNTER — TELEPHONE (OUTPATIENT)
Dept: ORTHOPEDIC SURGERY | Age: 38
End: 2018-11-26

## 2019-01-07 ENCOUNTER — TELEPHONE (OUTPATIENT)
Dept: ORTHOPEDIC SURGERY | Age: 39
End: 2019-01-07

## 2019-01-21 ENCOUNTER — TELEPHONE (OUTPATIENT)
Dept: ORTHOPEDIC SURGERY | Age: 39
End: 2019-01-21

## 2019-01-22 ENCOUNTER — OFFICE VISIT (OUTPATIENT)
Dept: ORTHOPEDIC SURGERY | Age: 39
End: 2019-01-22
Payer: COMMERCIAL

## 2019-01-22 VITALS
SYSTOLIC BLOOD PRESSURE: 126 MMHG | HEIGHT: 69 IN | WEIGHT: 162.92 LBS | DIASTOLIC BLOOD PRESSURE: 74 MMHG | HEART RATE: 77 BPM | BODY MASS INDEX: 24.13 KG/M2

## 2019-01-22 DIAGNOSIS — S43.431A PARALABRAL CYST OF RIGHT SHOULDER, INITIAL ENCOUNTER: Primary | ICD-10-CM

## 2019-01-22 DIAGNOSIS — S40.011D CONTUSION OF RIGHT SHOULDER, SUBSEQUENT ENCOUNTER: ICD-10-CM

## 2019-01-22 DIAGNOSIS — S46.011D STRAIN OF RIGHT ROTATOR CUFF CAPSULE, SUBSEQUENT ENCOUNTER: ICD-10-CM

## 2019-01-22 DIAGNOSIS — M75.41 SHOULDER IMPINGEMENT, RIGHT: ICD-10-CM

## 2019-01-22 PROCEDURE — 99243 OFF/OP CNSLTJ NEW/EST LOW 30: CPT | Performed by: ORTHOPAEDIC SURGERY

## 2019-01-22 RX ORDER — BENZONATATE 100 MG/1
100 CAPSULE ORAL
COMMUNITY
Start: 2019-01-17 | End: 2019-01-27

## 2019-04-18 ENCOUNTER — TELEPHONE (OUTPATIENT)
Dept: ORTHOPEDIC SURGERY | Age: 39
End: 2019-04-18

## 2019-04-19 NOTE — TELEPHONE ENCOUNTER
We have not seen her since jan. I will call and work her into schedule if needed. She has not follow up scheduled.   Thank you

## 2021-03-28 ENCOUNTER — HOSPITAL ENCOUNTER (OUTPATIENT)
Age: 41
Setting detail: OBSERVATION
Discharge: HOME | End: 2021-03-28
Attending: EMERGENCY MEDICINE | Admitting: INTERNAL MEDICINE
Payer: MEDICAID

## 2021-03-28 ENCOUNTER — APPOINTMENT (OUTPATIENT)
Dept: GENERAL RADIOLOGY | Age: 41
End: 2021-03-28
Payer: MEDICAID

## 2021-03-28 VITALS
TEMPERATURE: 97.9 F | SYSTOLIC BLOOD PRESSURE: 114 MMHG | DIASTOLIC BLOOD PRESSURE: 76 MMHG | OXYGEN SATURATION: 95 % | HEART RATE: 60 BPM | RESPIRATION RATE: 15 BRPM | BODY MASS INDEX: 24.98 KG/M2 | WEIGHT: 168.65 LBS | HEIGHT: 69 IN

## 2021-03-28 DIAGNOSIS — R07.9 ACUTE CHEST PAIN: Primary | ICD-10-CM

## 2021-03-28 LAB
A/G RATIO: 1.4 (ref 1.1–2.2)
ALBUMIN SERPL-MCNC: 4.3 G/DL (ref 3.4–5)
ALP BLD-CCNC: 78 U/L (ref 40–129)
ALT SERPL-CCNC: 27 U/L (ref 10–40)
ANION GAP SERPL CALCULATED.3IONS-SCNC: 12 MMOL/L (ref 3–16)
ANION GAP SERPL CALCULATED.3IONS-SCNC: 7 MMOL/L (ref 3–16)
AST SERPL-CCNC: 28 U/L (ref 15–37)
BASOPHILS ABSOLUTE: 0.1 K/UL (ref 0–0.2)
BASOPHILS ABSOLUTE: 0.1 K/UL (ref 0–0.2)
BASOPHILS RELATIVE PERCENT: 0.9 %
BASOPHILS RELATIVE PERCENT: 1.3 %
BILIRUB SERPL-MCNC: <0.2 MG/DL (ref 0–1)
BUN BLDV-MCNC: 11 MG/DL (ref 7–20)
BUN BLDV-MCNC: 9 MG/DL (ref 7–20)
CALCIUM SERPL-MCNC: 8.3 MG/DL (ref 8.3–10.6)
CALCIUM SERPL-MCNC: 9.3 MG/DL (ref 8.3–10.6)
CHLORIDE BLD-SCNC: 103 MMOL/L (ref 99–110)
CHLORIDE BLD-SCNC: 106 MMOL/L (ref 99–110)
CO2: 21 MMOL/L (ref 21–32)
CO2: 26 MMOL/L (ref 21–32)
CREAT SERPL-MCNC: 0.6 MG/DL (ref 0.6–1.1)
CREAT SERPL-MCNC: 0.6 MG/DL (ref 0.6–1.1)
D DIMER: 0.21 UG/ML FEU
EKG ATRIAL RATE: 64 BPM
EKG DIAGNOSIS: NORMAL
EKG P AXIS: 65 DEGREES
EKG P-R INTERVAL: 168 MS
EKG Q-T INTERVAL: 418 MS
EKG QRS DURATION: 74 MS
EKG QTC CALCULATION (BAZETT): 431 MS
EKG R AXIS: 0 DEGREES
EKG T AXIS: 42 DEGREES
EKG VENTRICULAR RATE: 64 BPM
EOSINOPHILS ABSOLUTE: 0.1 K/UL (ref 0–0.6)
EOSINOPHILS ABSOLUTE: 0.2 K/UL (ref 0–0.6)
EOSINOPHILS RELATIVE PERCENT: 2.2 %
EOSINOPHILS RELATIVE PERCENT: 2.4 %
GFR AFRICAN AMERICAN: >60
GFR AFRICAN AMERICAN: >60
GFR NON-AFRICAN AMERICAN: >60
GFR NON-AFRICAN AMERICAN: >60
GLOBULIN: 3 G/DL
GLUCOSE BLD-MCNC: 88 MG/DL (ref 70–99)
GLUCOSE BLD-MCNC: 91 MG/DL (ref 70–99)
HCT VFR BLD CALC: 34 % (ref 36–48)
HCT VFR BLD CALC: 37.7 % (ref 36–48)
HEMOGLOBIN: 11.6 G/DL (ref 12–16)
HEMOGLOBIN: 13 G/DL (ref 12–16)
LIPASE: 18 U/L (ref 13–60)
LYMPHOCYTES ABSOLUTE: 3.3 K/UL (ref 1–5.1)
LYMPHOCYTES ABSOLUTE: 3.8 K/UL (ref 1–5.1)
LYMPHOCYTES RELATIVE PERCENT: 44.9 %
LYMPHOCYTES RELATIVE PERCENT: 53.8 %
MAGNESIUM: 2 MG/DL (ref 1.8–2.4)
MCH RBC QN AUTO: 32.1 PG (ref 26–34)
MCH RBC QN AUTO: 32.4 PG (ref 26–34)
MCHC RBC AUTO-ENTMCNC: 34.1 G/DL (ref 31–36)
MCHC RBC AUTO-ENTMCNC: 34.4 G/DL (ref 31–36)
MCV RBC AUTO: 94.2 FL (ref 80–100)
MCV RBC AUTO: 94.2 FL (ref 80–100)
MONOCYTES ABSOLUTE: 0.5 K/UL (ref 0–1.3)
MONOCYTES ABSOLUTE: 0.6 K/UL (ref 0–1.3)
MONOCYTES RELATIVE PERCENT: 7.6 %
MONOCYTES RELATIVE PERCENT: 8.7 %
NEUTROPHILS ABSOLUTE: 2.1 K/UL (ref 1.7–7.7)
NEUTROPHILS ABSOLUTE: 3.7 K/UL (ref 1.7–7.7)
NEUTROPHILS RELATIVE PERCENT: 34.2 %
NEUTROPHILS RELATIVE PERCENT: 44 %
PDW BLD-RTO: 11.7 % (ref 12.4–15.4)
PDW BLD-RTO: 12.4 % (ref 12.4–15.4)
PLATELET # BLD: 206 K/UL (ref 135–450)
PLATELET # BLD: 251 K/UL (ref 135–450)
PMV BLD AUTO: 7.3 FL (ref 5–10.5)
PMV BLD AUTO: 7.7 FL (ref 5–10.5)
POTASSIUM REFLEX MAGNESIUM: 4 MMOL/L (ref 3.5–5.1)
POTASSIUM SERPL-SCNC: 4.1 MMOL/L (ref 3.5–5.1)
RBC # BLD: 3.61 M/UL (ref 4–5.2)
RBC # BLD: 4.01 M/UL (ref 4–5.2)
SODIUM BLD-SCNC: 136 MMOL/L (ref 136–145)
SODIUM BLD-SCNC: 139 MMOL/L (ref 136–145)
TOTAL PROTEIN: 7.3 G/DL (ref 6.4–8.2)
TROPONIN: <0.01 NG/ML
WBC # BLD: 6.2 K/UL (ref 4–11)
WBC # BLD: 8.4 K/UL (ref 4–11)

## 2021-03-28 PROCEDURE — 99284 EMERGENCY DEPT VISIT MOD MDM: CPT

## 2021-03-28 PROCEDURE — 36415 COLL VENOUS BLD VENIPUNCTURE: CPT

## 2021-03-28 PROCEDURE — 6360000002 HC RX W HCPCS: Performed by: STUDENT IN AN ORGANIZED HEALTH CARE EDUCATION/TRAINING PROGRAM

## 2021-03-28 PROCEDURE — 83735 ASSAY OF MAGNESIUM: CPT

## 2021-03-28 PROCEDURE — 71046 X-RAY EXAM CHEST 2 VIEWS: CPT

## 2021-03-28 PROCEDURE — 94760 N-INVAS EAR/PLS OXIMETRY 1: CPT

## 2021-03-28 PROCEDURE — 6360000002 HC RX W HCPCS: Performed by: EMERGENCY MEDICINE

## 2021-03-28 PROCEDURE — G0378 HOSPITAL OBSERVATION PER HR: HCPCS

## 2021-03-28 PROCEDURE — 6370000000 HC RX 637 (ALT 250 FOR IP): Performed by: STUDENT IN AN ORGANIZED HEALTH CARE EDUCATION/TRAINING PROGRAM

## 2021-03-28 PROCEDURE — 2580000003 HC RX 258: Performed by: EMERGENCY MEDICINE

## 2021-03-28 PROCEDURE — 96372 THER/PROPH/DIAG INJ SC/IM: CPT

## 2021-03-28 PROCEDURE — 85025 COMPLETE CBC W/AUTO DIFF WBC: CPT

## 2021-03-28 PROCEDURE — 96376 TX/PRO/DX INJ SAME DRUG ADON: CPT

## 2021-03-28 PROCEDURE — 96375 TX/PRO/DX INJ NEW DRUG ADDON: CPT

## 2021-03-28 PROCEDURE — 96374 THER/PROPH/DIAG INJ IV PUSH: CPT

## 2021-03-28 PROCEDURE — 80053 COMPREHEN METABOLIC PANEL: CPT

## 2021-03-28 PROCEDURE — 83690 ASSAY OF LIPASE: CPT

## 2021-03-28 PROCEDURE — 93005 ELECTROCARDIOGRAM TRACING: CPT | Performed by: EMERGENCY MEDICINE

## 2021-03-28 PROCEDURE — 6370000000 HC RX 637 (ALT 250 FOR IP): Performed by: EMERGENCY MEDICINE

## 2021-03-28 PROCEDURE — 85379 FIBRIN DEGRADATION QUANT: CPT

## 2021-03-28 PROCEDURE — 6370000000 HC RX 637 (ALT 250 FOR IP): Performed by: INTERNAL MEDICINE

## 2021-03-28 PROCEDURE — 93010 ELECTROCARDIOGRAM REPORT: CPT | Performed by: INTERNAL MEDICINE

## 2021-03-28 PROCEDURE — 84484 ASSAY OF TROPONIN QUANT: CPT

## 2021-03-28 RX ORDER — ASPIRIN 81 MG/1
324 TABLET, CHEWABLE ORAL ONCE
Status: COMPLETED | OUTPATIENT
Start: 2021-03-28 | End: 2021-03-28

## 2021-03-28 RX ORDER — SODIUM CHLORIDE 0.9 % (FLUSH) 0.9 %
10 SYRINGE (ML) INJECTION PRN
Status: DISCONTINUED | OUTPATIENT
Start: 2021-03-28 | End: 2021-03-28 | Stop reason: HOSPADM

## 2021-03-28 RX ORDER — KETOROLAC TROMETHAMINE 30 MG/ML
15 INJECTION, SOLUTION INTRAMUSCULAR; INTRAVENOUS ONCE
Status: COMPLETED | OUTPATIENT
Start: 2021-03-28 | End: 2021-03-28

## 2021-03-28 RX ORDER — ONDANSETRON 2 MG/ML
4 INJECTION INTRAMUSCULAR; INTRAVENOUS EVERY 6 HOURS PRN
Status: DISCONTINUED | OUTPATIENT
Start: 2021-03-28 | End: 2021-03-28 | Stop reason: HOSPADM

## 2021-03-28 RX ORDER — ASPIRIN 81 MG/1
81 TABLET, CHEWABLE ORAL DAILY
Status: DISCONTINUED | OUTPATIENT
Start: 2021-03-29 | End: 2021-03-28 | Stop reason: HOSPADM

## 2021-03-28 RX ORDER — NITROGLYCERIN 0.4 MG/1
0.4 TABLET SUBLINGUAL EVERY 5 MIN PRN
Status: DISCONTINUED | OUTPATIENT
Start: 2021-03-28 | End: 2021-03-28 | Stop reason: HOSPADM

## 2021-03-28 RX ORDER — MORPHINE SULFATE 4 MG/ML
4 INJECTION, SOLUTION INTRAMUSCULAR; INTRAVENOUS ONCE
Status: COMPLETED | OUTPATIENT
Start: 2021-03-28 | End: 2021-03-28

## 2021-03-28 RX ORDER — NAPROXEN 500 MG/1
500 TABLET ORAL 2 TIMES DAILY PRN
Qty: 30 TABLET | Refills: 1 | Status: SHIPPED | OUTPATIENT
Start: 2021-03-28

## 2021-03-28 RX ORDER — MORPHINE SULFATE 4 MG/ML
4 INJECTION, SOLUTION INTRAMUSCULAR; INTRAVENOUS EVERY 4 HOURS PRN
Status: DISCONTINUED | OUTPATIENT
Start: 2021-03-28 | End: 2021-03-28

## 2021-03-28 RX ORDER — NAPROXEN 250 MG/1
500 TABLET ORAL 2 TIMES DAILY WITH MEALS
Status: DISCONTINUED | OUTPATIENT
Start: 2021-03-28 | End: 2021-03-28 | Stop reason: HOSPADM

## 2021-03-28 RX ORDER — SODIUM CHLORIDE 9 MG/ML
1000 INJECTION, SOLUTION INTRAVENOUS CONTINUOUS
Status: DISCONTINUED | OUTPATIENT
Start: 2021-03-28 | End: 2021-03-28 | Stop reason: HOSPADM

## 2021-03-28 RX ORDER — PANTOPRAZOLE SODIUM 40 MG/1
40 TABLET, DELAYED RELEASE ORAL
Status: DISCONTINUED | OUTPATIENT
Start: 2021-03-28 | End: 2021-03-28 | Stop reason: HOSPADM

## 2021-03-28 RX ORDER — ATORVASTATIN CALCIUM 80 MG/1
80 TABLET, FILM COATED ORAL NIGHTLY
Status: DISCONTINUED | OUTPATIENT
Start: 2021-03-28 | End: 2021-03-28 | Stop reason: HOSPADM

## 2021-03-28 RX ORDER — ACETAMINOPHEN 650 MG/1
650 SUPPOSITORY RECTAL EVERY 6 HOURS PRN
Status: DISCONTINUED | OUTPATIENT
Start: 2021-03-28 | End: 2021-03-28 | Stop reason: HOSPADM

## 2021-03-28 RX ORDER — MORPHINE SULFATE 2 MG/ML
2 INJECTION, SOLUTION INTRAMUSCULAR; INTRAVENOUS EVERY 4 HOURS PRN
Status: DISCONTINUED | OUTPATIENT
Start: 2021-03-28 | End: 2021-03-28

## 2021-03-28 RX ORDER — PANTOPRAZOLE SODIUM 40 MG/1
40 TABLET, DELAYED RELEASE ORAL 2 TIMES DAILY
Qty: 28 TABLET | Refills: 0 | Status: SHIPPED | OUTPATIENT
Start: 2021-03-28 | End: 2021-04-11

## 2021-03-28 RX ORDER — SODIUM CHLORIDE 0.9 % (FLUSH) 0.9 %
10 SYRINGE (ML) INJECTION EVERY 12 HOURS SCHEDULED
Status: DISCONTINUED | OUTPATIENT
Start: 2021-03-28 | End: 2021-03-28 | Stop reason: HOSPADM

## 2021-03-28 RX ORDER — ACETAMINOPHEN 325 MG/1
650 TABLET ORAL EVERY 6 HOURS PRN
Status: DISCONTINUED | OUTPATIENT
Start: 2021-03-28 | End: 2021-03-28 | Stop reason: HOSPADM

## 2021-03-28 RX ORDER — MORPHINE SULFATE 2 MG/ML
2 INJECTION, SOLUTION INTRAMUSCULAR; INTRAVENOUS ONCE
Status: COMPLETED | OUTPATIENT
Start: 2021-03-28 | End: 2021-03-28

## 2021-03-28 RX ORDER — SODIUM CHLORIDE 9 MG/ML
25 INJECTION, SOLUTION INTRAVENOUS PRN
Status: DISCONTINUED | OUTPATIENT
Start: 2021-03-28 | End: 2021-03-28 | Stop reason: HOSPADM

## 2021-03-28 RX ADMIN — MORPHINE SULFATE 4 MG: 4 INJECTION, SOLUTION INTRAMUSCULAR; INTRAVENOUS at 01:40

## 2021-03-28 RX ADMIN — ASPIRIN 324 MG: 81 TABLET, CHEWABLE ORAL at 00:49

## 2021-03-28 RX ADMIN — LIDOCAINE HYDROCHLORIDE: 20 SOLUTION ORAL; TOPICAL at 10:14

## 2021-03-28 RX ADMIN — KETOROLAC TROMETHAMINE 15 MG: 30 INJECTION, SOLUTION INTRAMUSCULAR at 00:50

## 2021-03-28 RX ADMIN — ACETAMINOPHEN 650 MG: 325 TABLET ORAL at 06:02

## 2021-03-28 RX ADMIN — ENOXAPARIN SODIUM 40 MG: 40 INJECTION SUBCUTANEOUS at 09:05

## 2021-03-28 RX ADMIN — ACETAMINOPHEN 650 MG: 325 TABLET ORAL at 15:25

## 2021-03-28 RX ADMIN — SODIUM CHLORIDE 1000 ML: 9 INJECTION, SOLUTION INTRAVENOUS at 01:39

## 2021-03-28 RX ADMIN — NITROGLYCERIN 1 INCH: 20 OINTMENT TOPICAL at 01:40

## 2021-03-28 RX ADMIN — PANTOPRAZOLE SODIUM 40 MG: 40 TABLET, DELAYED RELEASE ORAL at 09:05

## 2021-03-28 RX ADMIN — NAPROXEN 500 MG: 250 TABLET ORAL at 09:05

## 2021-03-28 RX ADMIN — MORPHINE SULFATE 2 MG: 2 INJECTION, SOLUTION INTRAMUSCULAR; INTRAVENOUS at 04:34

## 2021-03-28 RX ADMIN — NITROGLYCERIN 0.4 MG: 0.4 TABLET, ORALLY DISINTEGRATING SUBLINGUAL at 05:56

## 2021-03-28 ASSESSMENT — PAIN - FUNCTIONAL ASSESSMENT: PAIN_FUNCTIONAL_ASSESSMENT: ACTIVITIES ARE NOT PREVENTED

## 2021-03-28 ASSESSMENT — PAIN SCALES - GENERAL
PAINLEVEL_OUTOF10: 4
PAINLEVEL_OUTOF10: 2
PAINLEVEL_OUTOF10: 8
PAINLEVEL_OUTOF10: 6
PAINLEVEL_OUTOF10: 2
PAINLEVEL_OUTOF10: 6
PAINLEVEL_OUTOF10: 2
PAINLEVEL_OUTOF10: 6
PAINLEVEL_OUTOF10: 4

## 2021-03-28 ASSESSMENT — PAIN DESCRIPTION - DESCRIPTORS
DESCRIPTORS: PRESSURE
DESCRIPTORS: ACHING
DESCRIPTORS: CONSTANT

## 2021-03-28 ASSESSMENT — PAIN DESCRIPTION - PROGRESSION
CLINICAL_PROGRESSION: NOT CHANGED

## 2021-03-28 ASSESSMENT — PAIN DESCRIPTION - ORIENTATION
ORIENTATION: MID

## 2021-03-28 ASSESSMENT — PAIN DESCRIPTION - FREQUENCY
FREQUENCY: INTERMITTENT
FREQUENCY: CONTINUOUS
FREQUENCY: INTERMITTENT

## 2021-03-28 ASSESSMENT — PAIN DESCRIPTION - PAIN TYPE
TYPE: ACUTE PAIN

## 2021-03-28 ASSESSMENT — PAIN DESCRIPTION - ONSET: ONSET: ON-GOING

## 2021-03-28 ASSESSMENT — PAIN DESCRIPTION - LOCATION: LOCATION: CHEST

## 2021-03-28 NOTE — PROGRESS NOTES
Call to lab to follow up on 1300 troponin results. Reports that specimen collected at 1240 and is running now.  Results to follow shortly

## 2021-03-28 NOTE — ED PROVIDER NOTES
CHIEF COMPLAINT  Chief Complaint   Patient presents with    Chest Pain     x 45 min, worse with deep breath       HISTORY OF PRESENT ILLNESS  Lexis Espinal is a 38 y.o. female who presents to the ED complaining of 1 hour history of substernal anterior chest pressure 8/10 in severity without radiation and associated with some numbness and tingling of the right lateral arm. Patient denies shortness of breath or cough. No fevers or chills. No headache. No neck or back pain. No radiation of pain. Patient has no history of heart or lung issues or PE/DVT. Patient does smoke but is not currently on any birth control. Patient is takes no medications. Patient reports mild nausea without vomiting. No diaphoresis. No headaches. No neurologic deficits. No migrating pain. Patient's pain started gradually and has built up over the last hour. No Lower extremity edema. No other complaints, modifying factors or associated symptoms. Nursing notes reviewed. No past medical history on file. Past Surgical History:   Procedure Laterality Date    APPENDECTOMY       SECTION      TUBAL LIGATION       No family history on file.   Social History     Socioeconomic History    Marital status: Single     Spouse name: Not on file    Number of children: Not on file    Years of education: Not on file    Highest education level: Not on file   Occupational History    Not on file   Social Needs    Financial resource strain: Not on file    Food insecurity     Worry: Not on file     Inability: Not on file    Transportation needs     Medical: Not on file     Non-medical: Not on file   Tobacco Use    Smoking status: Light Tobacco Smoker    Smokeless tobacco: Never Used   Substance and Sexual Activity    Alcohol use: Yes     Comment: socially    Drug use: No    Sexual activity: Not on file   Lifestyle    Physical activity     Days per week: Not on file     Minutes per session: Not on file    Stress: Not on file   Relationships    Social connections     Talks on phone: Not on file     Gets together: Not on file     Attends Congregational service: Not on file     Active member of club or organization: Not on file     Attends meetings of clubs or organizations: Not on file     Relationship status: Not on file    Intimate partner violence     Fear of current or ex partner: Not on file     Emotionally abused: Not on file     Physically abused: Not on file     Forced sexual activity: Not on file   Other Topics Concern    Not on file   Social History Narrative    Not on file     No current facility-administered medications for this encounter. No current outpatient medications on file. Allergies   Allergen Reactions    Bee Venom Swelling    Adhesive Tape Rash       REVIEW OF SYSTEMS  Positives and pertinent negatives as per HPI. Ten other systems were reviewed and are negative. Nursing notes pertaining to ROS were reviewed. PHYSICAL EXAM   BP (!) 145/92   Pulse 69   Temp 97.4 °F (36.3 °C)   Resp 12   Ht 5' 9\" (1.753 m)   LMP 03/26/2021   SpO2 97%   BMI 24.06 kg/m²   General: Alert and oriented x 3, NAD. No increased work of breathing or accessory muscle use. Non-ill appearing. Appropriate and interactive  Eyes: PERRL, no scleral icterus, injection or exudate. EOMI. HENT: Atraumatic. Oral pharynx is clear, moist, no enanthem. No tonsillar hypertropy or exudate. Nasal mucous membranes are clear. TM's are clear without evidence of otitis media. Neck:  No Lymphadenopathy. Non-tender to palpation. Normal ROM. No JVD. No thyromegaly. No Mass. PULMONARY: Lungs clear bilaterally without wheezes, rales or rhonchi. Good air movement bilaterally. CV: Regular rate and rhythm without murmurs, rubs or gallops. ABD: Soft, non-tender, non-distended, normal bowel sounds, no hepatosplenomegaly, no masses. No peritoneal signs, rebound or guarding. Back:  No CVAT, no rash.     EXT: No cyanosis or clubbing. No rash. CR < 2 seconds. No tenderness to palpation. No lower extremity edema. +2 pulses in upper/lower extremities bilaterally. Skin is warm and dry. Negative Homans' sign. No calf tenderness or calf warmth. Patient has bilateral equal radial and femoral pulses without delay  PSYCH: normal affect    12 LEAD EKG AS INTERPRETED BY ME:  NSR  RATE OF 64  NORMAL AXIS   NORMAL INTERVALS  NO ST-T SIGNS OF ACUTE ISCHEMIA OR INFARCT        RADIOLOGY    XR CHEST (2 VW)   Final Result   No acute process by radiograph. LABS  I have reviewed all labs for this visit.    Results for orders placed or performed during the hospital encounter of 03/28/21   Comprehensive Metabolic Panel w/ Reflex to MG   Result Value Ref Range    Sodium 136 136 - 145 mmol/L    Potassium reflex Magnesium 4.0 3.5 - 5.1 mmol/L    Chloride 103 99 - 110 mmol/L    CO2 21 21 - 32 mmol/L    Anion Gap 12 3 - 16    Glucose 91 70 - 99 mg/dL    BUN 11 7 - 20 mg/dL    CREATININE 0.6 0.6 - 1.1 mg/dL    GFR Non-African American >60 >60    GFR African American >60 >60    Calcium 9.3 8.3 - 10.6 mg/dL    Total Protein 7.3 6.4 - 8.2 g/dL    Albumin 4.3 3.4 - 5.0 g/dL    Albumin/Globulin Ratio 1.4 1.1 - 2.2    Total Bilirubin <0.2 0.0 - 1.0 mg/dL    Alkaline Phosphatase 78 40 - 129 U/L    ALT 27 10 - 40 U/L    AST 28 15 - 37 U/L    Globulin 3.0 g/dL   CBC Auto Differential   Result Value Ref Range    WBC 8.4 4.0 - 11.0 K/uL    RBC 4.01 4.00 - 5.20 M/uL    Hemoglobin 13.0 12.0 - 16.0 g/dL    Hematocrit 37.7 36.0 - 48.0 %    MCV 94.2 80.0 - 100.0 fL    MCH 32.4 26.0 - 34.0 pg    MCHC 34.4 31.0 - 36.0 g/dL    RDW 11.7 (L) 12.4 - 15.4 %    Platelets 298 280 - 992 K/uL    MPV 7.7 5.0 - 10.5 fL    Neutrophils % 44.0 %    Lymphocytes % 44.9 %    Monocytes % 7.6 %    Eosinophils % 2.2 %    Basophils % 1.3 %    Neutrophils Absolute 3.7 1.7 - 7.7 K/uL    Lymphocytes Absolute 3.8 1.0 - 5.1 K/uL    Monocytes Absolute 0.6 0.0 - 1.3 K/uL Eosinophils Absolute 0.2 0.0 - 0.6 K/uL    Basophils Absolute 0.1 0.0 - 0.2 K/uL   Troponin   Result Value Ref Range    Troponin <0.01 <0.01 ng/mL   D-Dimer, Quantitative   Result Value Ref Range    D-Dimer, Quant 0.21 <0.50 ug/mL FEU           ED COURSE/MDM  Chest pain: Patient has a heart score of 3 but a concerning history with sudden onset of pressure-like chest pain radiating to the right arm. After interventions in the emergency room, patient was given nitroglycerin, aspirin, morphine and Toradol with only mild improvement of her symptoms from an 8/10 to a 6/10. Patient did have a normal initial EKG, troponin and D-dimer essentially ruling out aortic dissection and pulmonary embolism patient does not have evidence of pleural effusion, pulmonary edema, cardiomegaly, pneumothorax but patient cannot be comfortably ruled out for ACS given her ongoing discomfort and concerning story. Patient will be admitted to the hospitalist for further inpatient evaluation, accepted by Dr. Pedro Pablo Benítez. Patient was given scripts for the following medications. I counseled patient how to take these medications. New Prescriptions    No medications on file         CLINICAL IMPRESSION  1. Acute chest pain        Blood pressure (!) 145/92, pulse 69, temperature 97.4 °F (36.3 °C), resp. rate 12, height 5' 9\" (1.753 m), last menstrual period 03/26/2021, SpO2 97 %. Follow-up with:  No follow-up provider specified.         Rochelle Peterson MD  03/28/21 9486

## 2021-03-28 NOTE — DISCHARGE SUMMARY
[facility] Medicine History & Physical and Discharge Summary       PCP: Peggy Penn     Date of Admission: 3/28/2021     Date of Service: Pt seen/examined on 3/28/2021 and Placed in Observation.     Chief Complaint:  Chest pain.         History Of Present Illness: The patient is a 38 y.o. female recently treated by OBGYN with bactrim for UTI, Hx of R shoulder work related injury, who presents to [facility] with chest pain.      She reports acute onset severe right retrosternal chest pain last night while she was lying in bed with her daughter. Became very nauseated, did not throw up. No diarrhea.      Denies pleurisy, no cough, no fever.         Pain persisted and she decided to have this evaluated.      She denies exertional component of the pain. No prior similar episodes.               Past Medical History:    Past Medical History    No past medical history on file.        Past Surgical History:    Past Surgical History             Procedure Laterality Date    APPENDECTOMY         SECTION        TUBAL LIGATION                Medications Prior to Admission:    Home Medications           Prior to Admission medications    Medication Sig Start Date End Date Taking? Authorizing Provider   naproxen (NAPROSYN) 500 MG tablet Take 1 tablet by mouth 2 times daily as needed for Pain 3/28/21   Yes Fina Juarez MD   pantoprazole (PROTONIX) 40 MG tablet Take 1 tablet by mouth 2 times daily for 14 days 3/28/21 4/11/21 Yes Fina Juarez MD            Allergies:  Bee venom and Adhesive tape     Social History:  The patient currently lives at home.      TOBACCO:   reports that she has been smoking. She has never used smokeless tobacco.  ETOH:   reports current alcohol use.        Family History:  Reviewed in detail and negative for DM, Early CAD, Cancer, CVA. Positive as follows:     Family History   No family history on file.        REVIEW OF SYSTEMS:   As noted in the HPI. All other systems reviewed and negative.     PHYSICAL EXAM:     /71   Pulse 58   Temp 97.6 °F (36.4 °C) (Oral)   Resp 16   Ht 5' 9\" (1.753 m)   Wt 168 lb 10.4 oz (76.5 kg)   LMP 03/26/2021   SpO2 95%   BMI 24.91 kg/m²      General appearance: No apparent distress appears stated age and cooperative. HEENT Normal cephalic, atraumatic without obvious deformity. Pupils equal, round, and reactive to light. Extra ocular muscles intact. Conjunctivae/corneas clear. Neck: Supple, No jugular venous distention/bruits. Trachea midline without thyromegaly or adenopathy with full range of motion. Lungs: Clear to auscultation, bilaterally without Rales/Wheezes/Rhonchi with good respiratory effort. Heart: Regular rate and rhythm with Normal S1/S2 without murmurs, rubs or gallops, point of maximum impulse non-displaced  Abdomen: Soft, non-tender or non-distended without rigidity or guarding and positive bowel sounds all four quadrants. Extremities: No clubbing, cyanosis, or edema bilaterally. Full range of motion without deformity and normal gait intact. Skin: Skin color, texture, turgor normal.  No rashes or lesions. Neurologic: Alert and oriented X 3, neurovascularly intact with sensory/motor intact upper extremities/lower extremities, bilaterally. Cranial nerves: II-XII intact, grossly non-focal.  Mental status: Alert, oriented, thought content appropriate. Capillary Refill: Acceptable  < 3 seconds  Peripheral Pulses: +3 Easily felt, not easily obliterated with pressure        CBC   Recent Labs     03/28/21  0040 03/28/21  0836   WBC 8.4 6.2   HGB 13.0 11.6*   HCT 37.7 34.0*    206      RENAL       Recent Labs     03/28/21  0040 03/28/21  0836    139   K 4.0 4.1    106   CO2 21 26   BUN 11 9   CREATININE 0.6 0.6      LFT'S      Recent Labs     03/28/21  0040   AST 28   ALT 27   BILITOT <0.2   ALKPHOS 78      COAG  No results for input(s): INR in the last 72 hours.   CARDIAC ENZYMES Recent Labs     03/28/21  0040 03/28/21  0430   TROPONINI <0.01 <0.01         U/A:  No results found for: NITRITE, COLORU, WBCUA, RBCUA, MUCUS, BACTERIA, CLARITYU, SPECGRAV, LEUKOCYTESUR, BLOODU, GLUCOSEU, AMORPHOUS     ABG  No results found for: QIX8NGT, BEART, T9STTWBV, PHART, THGBART, NKC4XKI, PO2ART, ANB8WVJ                Active Hospital Problems     Diagnosis Date Noted    Chest pain [R07.9] 03/28/2021            PHYSICIANS CERTIFICATION:     I certify that Amira Espinal is expected to be hospitalized for less than 2 midnights based on the following assessment and plan:        ASSESSMENT/PLAN:        Chest Pain - non anginal by history. EKG NSR. Trop (-)x2 - pending third level. CXR clear. Lab work normal.   ddimer negative. Will start PPI and GI cocktail. If 3rd troponin is negative - no further inpatient testing is indicated.    Will discharge with trial of PPI and PCP follow up in 2 weeks - if symptoms persist - upper endoscopy may be helpful.          Pt refused urine pregnancy screen - report she has had tubal ligation 4 years ago and her  had vasectomy.          DVT Prophylaxis: lovenox  Diet: DIET CLEAR LIQUID; No Caffeine  Code Status: Full Code        Dispo - pending third troponin level - discharge home in stable condition.             Alanna Hobson MD

## 2021-03-28 NOTE — PROGRESS NOTES
Pt arrived to [address] from [facility] ED via stretcher. Pt is A/O x 4 ,VSS and c/o 6/10 chest pain at this time. Pt is requesting pain medication at this time. Skin assessment completed. Please see 4 eyes skin note and skin documentation. Bed locked in lowest position and call light is within reach. Oriented to room, call light and hourly rounding. Pt instructed to use call light to call out for assistance as needed. Will review orders and continue to monitor.      Gagandeep Whittaker RN 3/28/2021 5:42 AM

## 2021-03-28 NOTE — PROGRESS NOTES
4 Eyes Skin Assessment     NAME:  Carla Espinal  YOB: 1980  MEDICAL RECORD NUMBER:  [id number]    The patient is being assess for  Admission    I agree that 2 RN's have performed a thorough Head to Toe Skin Assessment on the patient. ALL assessment sites listed below have been assessed. Areas assessed by both nurses:    Head, Face, Ears, Shoulders, Back, Chest, Arms, Elbows, Hands, Sacrum. Buttock, Coccyx, Ischium and Legs. Feet and Heels        Does the Patient have a Wound?  No noted wound(s)       Santos Prevention initiated:  No   Wound Care Orders initiated:  No    Pressure Injury (Stage 3,4, Unstageable, DTI, NWPT, and Complex wounds) if present place consult order under [de-identified] No    New and Established Ostomies if present place consult order under : No      Nurse 1 eSignature: Electronically signed by Abdi Still RN on 3/28/21 at 5:43 AM EDT    **SHARE this note so that the co-signing nurse is able to place an eSignature**    Nurse 2 eSignature: Electronically signed by Ryan Dill RN on 3/28/21 at 8:07 AM EDT

## 2021-03-28 NOTE — H&P
Hospital Medicine History & Physical      PCP: [address] [facility]    Date of Admission: 3/28/2021    Date of Service: Pt seen/examined on 3/28/2021 and Placed in Observation. Chief Complaint:  Chest pain. History Of Present Illness: The patient is a 38 y.o. female recently treated by OBGYN with bactrim for UTI, Hx of R shoulder work related injury, who presents to [facility] with chest pain. She reports acute onset severe right retrosternal chest pain last night while she was lying in bed with her daughter. Became very nauseated, did not throw up. No diarrhea. Denies pleurisy, no cough, no fever. Pain persisted and she decided to have this evaluated. She denies exertional component of the pain. No prior similar episodes. Past Medical History:    No past medical history on file. Past Surgical History:        Procedure Laterality Date    APPENDECTOMY       SECTION      TUBAL LIGATION         Medications Prior to Admission:    Prior to Admission medications    Medication Sig Start Date End Date Taking? Authorizing Provider   naproxen (NAPROSYN) 500 MG tablet Take 1 tablet by mouth 2 times daily as needed for Pain 3/28/21  Yes Candis Chávez MD   pantoprazole (PROTONIX) 40 MG tablet Take 1 tablet by mouth 2 times daily for 14 days 3/28/21 4/11/21 Yes Candis Chávez MD       Allergies:  Bee venom and Adhesive tape    Social History:  The patient currently lives at home. TOBACCO:   reports that she has been smoking. She has never used smokeless tobacco.  ETOH:   reports current alcohol use. Family History:  Reviewed in detail and negative for DM, Early CAD, Cancer, CVA. Positive as follows:    No family history on file. REVIEW OF SYSTEMS:   As noted in the HPI.  All other systems reviewed and negative. PHYSICAL EXAM:    /71   Pulse 58   Temp 97.6 °F (36.4 °C) (Oral)   Resp 16   Ht 5' 9\" (1.753 m)   Wt 168 lb 10.4 oz (76.5 kg)   LMP 03/26/2021   SpO2 95%   BMI 24.91 kg/m²     General appearance: No apparent distress appears stated age and cooperative. HEENT Normal cephalic, atraumatic without obvious deformity. Pupils equal, round, and reactive to light. Extra ocular muscles intact. Conjunctivae/corneas clear. Neck: Supple, No jugular venous distention/bruits. Trachea midline without thyromegaly or adenopathy with full range of motion. Lungs: Clear to auscultation, bilaterally without Rales/Wheezes/Rhonchi with good respiratory effort. Heart: Regular rate and rhythm with Normal S1/S2 without murmurs, rubs or gallops, point of maximum impulse non-displaced  Abdomen: Soft, non-tender or non-distended without rigidity or guarding and positive bowel sounds all four quadrants. Extremities: No clubbing, cyanosis, or edema bilaterally. Full range of motion without deformity and normal gait intact. Skin: Skin color, texture, turgor normal.  No rashes or lesions. Neurologic: Alert and oriented X 3, neurovascularly intact with sensory/motor intact upper extremities/lower extremities, bilaterally. Cranial nerves: II-XII intact, grossly non-focal.  Mental status: Alert, oriented, thought content appropriate. Capillary Refill: Acceptable  < 3 seconds  Peripheral Pulses: +3 Easily felt, not easily obliterated with pressure      CBC   Recent Labs     03/28/21  0040 03/28/21  0836   WBC 8.4 6.2   HGB 13.0 11.6*   HCT 37.7 34.0*    206      RENAL  Recent Labs     03/28/21  0040 03/28/21  0836    139   K 4.0 4.1    106   CO2 21 26   BUN 11 9   CREATININE 0.6 0.6     LFT'S  Recent Labs     03/28/21  0040   AST 28   ALT 27   BILITOT <0.2   ALKPHOS 78     COAG  No results for input(s): INR in the last 72 hours.   CARDIAC ENZYMES  Recent Labs     03/28/21  0040 03/28/21  0430 TROPONINI <0.01 <0.01       U/A:  No results found for: NITRITE, COLORU, WBCUA, RBCUA, MUCUS, BACTERIA, CLARITYU, SPECGRAV, LEUKOCYTESUR, BLOODU, GLUCOSEU, AMORPHOUS    ABG  No results found for: XWD8QZY, BEART, E4JBJRIY, PHART, THGBART, WEM6WWT, PO2ART, UJJ5RFL        Active Hospital Problems    Diagnosis Date Noted    Chest pain [R07.9] 03/28/2021         PHYSICIANS CERTIFICATION:    I certify that Darren Espinal is expected to be hospitalized for less than 2 midnights based on the following assessment and plan:      ASSESSMENT/PLAN:      Chest Pain - non anginal by history. EKG NSR. Trop (-)x2 - pending third level. CXR clear. Lab work normal.   ddimer negative. Will start PPI and GI cocktail. If 3rd troponin is negative - no further inpatient testing is indicated. Will discharge with trial of PPI and PCP follow up in 2 weeks - if symptoms persist - upper endoscopy may be helpful. DVT Prophylaxis: lovenox  Diet: DIET CLEAR LIQUID; No Caffeine  Code Status: Full Code      Dispo - pending third troponin level - discharge home in stable condition. Cyndy Lopez MD    Thank you [facility] for the opportunity to be involved in this patient's care. If you have any questions or concerns please feel free to contact me at [phone].

## 2021-03-28 NOTE — PROGRESS NOTES
Patient states she cannot be pregnant because she had a tubal ligation and her  had a vasectomy.  Requested urine test to make sure

## 2021-03-28 NOTE — PROGRESS NOTES
Troponin negative. Patient discharged home with . Still complaining of dull chest pain and headache. NO sob, no dizziness.

## 2021-03-28 NOTE — ED TRIAGE NOTES
Pain began about 45 minutes ago while laying in bed, describes as a pressure, is worse with taking a deep breath,  And if sitting and  leans head back it seems to make it hurt worse, otherwise is unchanged by positions, denies radiation of pain